# Patient Record
Sex: MALE | Race: WHITE | NOT HISPANIC OR LATINO | Employment: STUDENT | URBAN - METROPOLITAN AREA
[De-identification: names, ages, dates, MRNs, and addresses within clinical notes are randomized per-mention and may not be internally consistent; named-entity substitution may affect disease eponyms.]

---

## 2018-10-31 ENCOUNTER — OFFICE VISIT (OUTPATIENT)
Dept: URGENT CARE | Facility: CLINIC | Age: 28
End: 2018-10-31
Payer: MEDICAID

## 2018-10-31 VITALS
OXYGEN SATURATION: 97 % | WEIGHT: 147 LBS | DIASTOLIC BLOOD PRESSURE: 52 MMHG | BODY MASS INDEX: 23.07 KG/M2 | HEIGHT: 67 IN | TEMPERATURE: 98.8 F | SYSTOLIC BLOOD PRESSURE: 102 MMHG | HEART RATE: 82 BPM

## 2018-10-31 DIAGNOSIS — S86.812A PATELLAR TENDON STRAIN, LEFT, INITIAL ENCOUNTER: Primary | ICD-10-CM

## 2018-10-31 PROCEDURE — 99204 OFFICE O/P NEW MOD 45 MIN: CPT | Performed by: PHYSICIAN ASSISTANT

## 2018-10-31 RX ORDER — IBUPROFEN 600 MG/1
600 TABLET ORAL EVERY 8 HOURS PRN
Qty: 15 TAB | Refills: 0 | Status: SHIPPED | OUTPATIENT
Start: 2018-10-31 | End: 2018-11-05

## 2018-10-31 RX ORDER — ACYCLOVIR 400 MG/1
400 TABLET ORAL 2 TIMES DAILY
COMMUNITY
End: 2019-06-04

## 2018-10-31 ASSESSMENT — ENCOUNTER SYMPTOMS
BACK PAIN: 0
WEAKNESS: 0
CHILLS: 0
FALLS: 0
CHANGE IN BOWEL HABIT: 0
VOMITING: 0
NUMBNESS: 0
DIARRHEA: 0
CONSTIPATION: 0
JOINT SWELLING: 0
FEVER: 0
COUGH: 0
NECK PAIN: 0
MYALGIAS: 0
ABDOMINAL PAIN: 0
NAUSEA: 0

## 2018-10-31 NOTE — PATIENT INSTRUCTIONS
RICE for Routine Care of Injuries  Introduction  Many injuries can be cared for using rest, ice, compression, and elevation (RICE therapy). Using RICE therapy can help to lessen pain and swelling. It can help your body to heal.  Rest   Reduce your normal activities and avoid using the injured part of your body. You can go back to your normal activities when you feel okay and your doctor says it is okay.  Ice   Do not put ice on your bare skin.  · Put ice in a plastic bag.  · Place a towel between your skin and the bag.  · Leave the ice on for 20 minutes, 2-3 times a day.  Do this for as long as told by your doctor.  Compression   Compression means putting pressure on the injured area. This can be done with an elastic bandage. If an elastic bandage has been applied:  · Remove and reapply the bandage every 3-4 hours or as told by your doctor.  · Make sure the bandage is not wrapped too tight. Wrap the bandage more loosely if part of your body beyond the bandage is blue, swollen, cold, painful, or loses feeling (numb).  · See your doctor if the bandage seems to make your problems worse.  Elevation   Elevation means keeping the injured area raised. Raise the injured area above your heart or the center of your chest if you can.  When should I get help?  You should get help if:  · You keep having pain and swelling.  · Your symptoms get worse.  Get help right away if:  You should get help right away if:  · You have sudden bad pain at or below the area of your injury.  · You have redness or more swelling around your injury.  · You have tingling or numbness at or below the injury that does not go away when you take off the bandage.  This information is not intended to replace advice given to you by your health care provider. Make sure you discuss any questions you have with your health care provider.  Document Released: 06/05/2009 Document Revised: 05/25/2017 Document Reviewed: 11/25/2015  © 2017 [employer]  Knee Sprain,  Adult  A knee sprain is a stretch or tear in a knee ligament. Knee ligaments are bands of tissue that connect bones in the knee to each other.  What are the causes?  This condition often results from:  · A fall.  · An injury to the knee.  What are the signs or symptoms?  Symptoms of this condition include:  · Trouble bending the leg.  · Swelling in the knee.  · Bruising around the knee.  · Tenderness or pain in the knee.  · Muscle spasms around the knee.  How is this diagnosed?  This condition may be diagnosed based on:  · A physical exam.  · What happened just before you started to have symptoms.  · Tests, including:  ¨ An X-ray. This may be done to make sure no bones are broken.  ¨ An MRI. This may be done to check if the ligament is torn.  ¨ Stress testing of the knee. This may be done to check ligament damage.  How is this treated?  Treatment for this condition may involve:  · Keeping the knee still (immobilized) with a cast, brace, or splint.  · Applying ice to the knee. This helps with pain and swelling.  · Keeping the knee raised (elevated) above the level of your heart when you are resting. This helps with pain and swelling.  · Taking medicine for pain.  · Exercises to prevent or limit permanent weakness or stiffness in your knee.  · Surgery to reconnect the ligament to the bone or to reconstruct it. This may be needed if the ligament tore all the way.  Follow these instructions at home:  If you have a splint or brace:  · Wear the splint or brace as told by your health care provider. Remove it only as told by your health care provider.  · Loosen the splint or brace if your toes tingle, become numb, or turn cold and blue.  · Keep the splint or brace clean.  · If the splint or brace is not waterproof:  ¨ Do not let it get wet.  ¨ Cover it with a watertight covering when you take a bath or a shower.  If you have a cast:  · Do not stick anything inside the cast to scratch your skin. Doing that increases your  risk of infection.  · Check the skin around the cast every day. Tell your health care provider about any concerns.  · You may put lotion on dry skin around the edges of the cast. Do not put lotion on the skin underneath the cast.  · Keep the cast clean.  · If the cast is not waterproof:  ¨ Do not let it get wet.  ¨ Cover it with a watertight covering when you take a bath or a shower.  Managing pain, stiffness, and swelling  · If directed, put ice on the injured area.  ¨ If you have a removable splint or brace, remove it as told by your health care provider.  ¨ Put ice in a plastic bag.  ¨ Place a towel between your skin and the bag or between your cast and the bag.  ¨ Leave the ice on for 20 minutes, 2-3 times a day.  · Gently move your toes often to avoid stiffness and to lessen swelling.  · Elevate the injured area above the level of your heart while you are sitting or lying down.  · Take over-the-counter and prescription medicines only as told by your health care provider.  General instructions  · Do exercises as told by your health care provider.  · Keep all follow-up visits as told by your health care provider. This is important.  Contact a health care provider if:  · You have pain that gets worse.  · The cast, brace, or splint does not fit right.  · The cast, brace, or splint gets damaged.  Get help right away if:  · You cannot use your injured joint to support any of your body weight (cannot bear weight).  · You cannot move the injured joint.  · You cannot walk more than a few steps without pain or without your knee buckling.  · You have significant pain, swelling, or numbness below the cast, brace, or splint.  This information is not intended to replace advice given to you by your health care provider. Make sure you discuss any questions you have with your health care provider.  Document Released: 12/18/2006 Document Revised: 09/06/2017 Document Reviewed: 07/07/2017  Elsevier Interactive Patient Education ©  2017 [employer]

## 2018-10-31 NOTE — PROGRESS NOTES
Subjective:   Miguel Angel De is a 27 y.o. male who presents for Knee Pain (pain in left knee, lifting heavy box and knee bent in a weird way, [id number])        Knee Injury   This is a new problem. Episode onset: 11 days. The problem occurs intermittently. The problem has been waxing and waning. Pertinent negatives include no abdominal pain, change in bowel habit, chills, coughing, fever, joint swelling, myalgias, nausea, neck pain, numbness, vomiting or weakness. The symptoms are aggravated by walking and standing. He has tried NSAIDs for the symptoms. The treatment provided mild relief.       Pt was lifting a box approx 100 lbs of laptops and a pop in R knee. Pain is described as a 3-4/10. Pt able to weight bear. Prolonged standing makes pain worse. Bending and resting alleviates pain. No previous injury or surgeries.     Review of Systems   Constitutional: Negative for chills, fever and malaise/fatigue.   Respiratory: Negative for cough.    Gastrointestinal: Negative for abdominal pain, change in bowel habit, constipation, diarrhea, nausea and vomiting.   Musculoskeletal: Positive for joint pain. Negative for back pain, falls, joint swelling, myalgias and neck pain.   Neurological: Negative for weakness and numbness.   All other systems reviewed and are negative.      PMH:  has no past medical history on file.  MEDS:   Current Outpatient Prescriptions:   •  acyclovir (ZOVIRAX) 400 MG tablet, Take 400 mg by mouth 2 times a day., Disp: , Rfl:   •  ibuprofen (MOTRIN) 600 MG Tab, Take 1 Tab by mouth every 8 hours as needed for up to 5 days., Disp: 15 Tab, Rfl: 0  ALLERGIES: No Known Allergies  SURGHX: History reviewed. No pertinent surgical history.  SOCHX:  reports that he has never smoked. He has never used smokeless tobacco. He reports that he drinks alcohol.  History reviewed. No pertinent family history.     Objective:   /52 (BP Location: Left arm, Patient Position: Sitting, BP Cuff Size: Adult)   Pulse 82   " Temp 37.1 °C (98.8 °F) (Temporal)   Ht 1.702 m (5' 7\")   Wt 66.7 kg (147 lb)   SpO2 97%   BMI 23.02 kg/m²     Physical Exam   Constitutional: He is oriented to person, place, and time. He appears well-developed and well-nourished. No distress.   HENT:   Head: Normocephalic and atraumatic.   Eyes: Pupils are equal, round, and reactive to light. Conjunctivae are normal.   Cardiovascular: Normal rate and regular rhythm.    Pulmonary/Chest: Effort normal and breath sounds normal.   Musculoskeletal:        Left knee: He exhibits normal range of motion, no swelling, no effusion, no ecchymosis, no erythema, normal alignment, no LCL laxity, no bony tenderness, normal meniscus and no MCL laxity. Tenderness found. Patellar tendon tenderness noted. No medial joint line, no lateral joint line, no MCL and no LCL tenderness noted.        Legs:  Neurological: He is alert and oriented to person, place, and time.   Skin: Skin is warm and dry.   Psychiatric: He has a normal mood and affect. His behavior is normal.   Vitals reviewed.        Assessment/Plan:     1. Patellar tendon strain, left, initial encounter  ibuprofen (MOTRIN) 600 MG Tab       Ace wrap applied. Pt directed RICE and OTC Ibuprofen/Naproxen as needed for pain. If sx worsen or persist return to clinic we can consider referral to sports med. Educational handout on knee sprain provided.    Follow-up with primary care provider within 7-10 days. Red flags and emergency room precautions discussed.  Patient appears understanding of information.           "

## 2019-06-04 ENCOUNTER — OFFICE VISIT (OUTPATIENT)
Dept: URGENT CARE | Facility: CLINIC | Age: 29
End: 2019-06-04
Payer: MEDICAID

## 2019-06-04 VITALS
DIASTOLIC BLOOD PRESSURE: 74 MMHG | OXYGEN SATURATION: 98 % | HEART RATE: 69 BPM | WEIGHT: 150 LBS | RESPIRATION RATE: 16 BRPM | SYSTOLIC BLOOD PRESSURE: 112 MMHG | TEMPERATURE: 98.7 F | BODY MASS INDEX: 23.49 KG/M2

## 2019-06-04 DIAGNOSIS — B00.1 HERPES LABIALIS: ICD-10-CM

## 2019-06-04 PROCEDURE — 99214 OFFICE O/P EST MOD 30 MIN: CPT | Performed by: NURSE PRACTITIONER

## 2019-06-04 RX ORDER — ACYCLOVIR 400 MG/1
400 TABLET ORAL 3 TIMES DAILY
Qty: 30 TAB | Refills: 0 | Status: SHIPPED | OUTPATIENT
Start: 2019-06-04 | End: 2019-06-14

## 2019-08-23 ENCOUNTER — OFFICE VISIT (OUTPATIENT)
Dept: URGENT CARE | Facility: CLINIC | Age: 29
End: 2019-08-23
Payer: MEDICAID

## 2019-08-23 VITALS
DIASTOLIC BLOOD PRESSURE: 62 MMHG | BODY MASS INDEX: 22.82 KG/M2 | WEIGHT: 150.6 LBS | TEMPERATURE: 98.6 F | OXYGEN SATURATION: 99 % | RESPIRATION RATE: 16 BRPM | HEIGHT: 68 IN | HEART RATE: 74 BPM | SYSTOLIC BLOOD PRESSURE: 104 MMHG

## 2019-08-23 DIAGNOSIS — B00.1 COLD SORE: ICD-10-CM

## 2019-08-23 PROCEDURE — 99214 OFFICE O/P EST MOD 30 MIN: CPT | Performed by: NURSE PRACTITIONER

## 2019-08-23 RX ORDER — ACYCLOVIR 400 MG/1
400 TABLET ORAL 3 TIMES DAILY
Qty: 21 TAB | Refills: 0 | Status: SHIPPED | OUTPATIENT
Start: 2019-08-23 | End: 2019-08-30

## 2019-08-25 ASSESSMENT — ENCOUNTER SYMPTOMS
FEVER: 0
VOMITING: 0
CHILLS: 0
NAUSEA: 0
SHORTNESS OF BREATH: 0
EYE PAIN: 0
DIZZINESS: 0
SORE THROAT: 0
MYALGIAS: 0

## 2019-08-25 NOTE — PROGRESS NOTES
"Subjective:   Miguel Angel De is a 28 y.o. male who presents for Cold Sores (mid upper lip cold sore; needs rx refill for acyclivir)  Patient is a 28-year-old male presents clinic today for evaluation of a cold sore outbreak over the past couple days that has progressively worsened..  Patient states that he does have a history of cold sores in which she has taken acyclovir in the past that has been effective.  Is not taking anything for the symptoms.  Patient denies any sore throat, fever, chills.  He complains of mild discomfort of the upper lip.      HPI  Review of Systems   Constitutional: Negative for chills and fever.   HENT: Negative for sore throat.    Eyes: Negative for pain.   Respiratory: Negative for shortness of breath.    Cardiovascular: Negative for chest pain.   Gastrointestinal: Negative for nausea and vomiting.   Genitourinary: Negative for hematuria.   Musculoskeletal: Negative for myalgias.   Skin: Negative for rash.        Cold sores of upper lip   Neurological: Negative for dizziness.     No Known Allergies   Objective:   /62 (BP Location: Left arm, Patient Position: Sitting, BP Cuff Size: Adult)   Pulse 74   Temp 37 °C (98.6 °F) (Temporal)   Resp 16   Ht 1.727 m (5' 8\")   Wt 68.3 kg (150 lb 9.6 oz)   SpO2 99%   BMI 22.90 kg/m²   Physical Exam   Constitutional: He is oriented to person, place, and time. He appears well-developed and well-nourished. No distress.   HENT:   Head: Normocephalic and atraumatic.   Right Ear: Tympanic membrane normal.   Left Ear: Tympanic membrane normal.   Nose: Nose normal. Right sinus exhibits no maxillary sinus tenderness and no frontal sinus tenderness. Left sinus exhibits no maxillary sinus tenderness and no frontal sinus tenderness.   Mouth/Throat: Uvula is midline, oropharynx is clear and moist and mucous membranes are normal. No posterior oropharyngeal edema, posterior oropharyngeal erythema or tonsillar abscesses. No tonsillar exudate.       Eyes: " Pupils are equal, round, and reactive to light. Conjunctivae and EOM are normal. Right eye exhibits no discharge. Left eye exhibits no discharge.   Cardiovascular: Normal rate and regular rhythm.   No murmur heard.  Pulmonary/Chest: Effort normal and breath sounds normal. No respiratory distress.   Abdominal: Soft. He exhibits no distension. There is no tenderness.   Neurological: He is alert and oriented to person, place, and time. He has normal reflexes. No sensory deficit.   Skin: Skin is warm, dry and intact.   Psychiatric: He has a normal mood and affect.         Assessment/Plan:   1. Cold sore  - acyclovir (ZOVIRAX) 400 MG tablet; Take 1 Tab by mouth 3 times a day for 7 days.  Dispense: 21 Tab; Refill: 0    Patient given precautionary s/sx that mandate immediate follow up and evaluation in the ED. Advised of risks of not doing so.    DDX, Supportive care, and indications for immediate follow-up discussed with patient.    Instructed to return to clinic or nearest emergency department if we are not available for any change in condition, further concerns, or worsening of symptoms.    The patient demonstrated a good understanding and agreed with the treatment plan.

## 2025-05-21 ENCOUNTER — RESULTS FOLLOW-UP (OUTPATIENT)
Dept: URGENT CARE | Facility: PHYSICIAN GROUP | Age: 35
End: 2025-05-21

## 2025-05-21 ENCOUNTER — OFFICE VISIT (OUTPATIENT)
Dept: URGENT CARE | Facility: PHYSICIAN GROUP | Age: 35
End: 2025-05-21
Payer: COMMERCIAL

## 2025-05-21 VITALS
WEIGHT: 152 LBS | OXYGEN SATURATION: 98 % | BODY MASS INDEX: 23.04 KG/M2 | HEIGHT: 68 IN | RESPIRATION RATE: 18 BRPM | HEART RATE: 87 BPM | SYSTOLIC BLOOD PRESSURE: 124 MMHG | TEMPERATURE: 97.6 F | DIASTOLIC BLOOD PRESSURE: 62 MMHG

## 2025-05-21 DIAGNOSIS — J02.9 PHARYNGITIS, UNSPECIFIED ETIOLOGY: Primary | ICD-10-CM

## 2025-05-21 LAB — S PYO DNA SPEC NAA+PROBE: NOT DETECTED

## 2025-05-21 PROCEDURE — 99203 OFFICE O/P NEW LOW 30 MIN: CPT | Performed by: STUDENT IN AN ORGANIZED HEALTH CARE EDUCATION/TRAINING PROGRAM

## 2025-05-21 PROCEDURE — 87651 STREP A DNA AMP PROBE: CPT | Performed by: STUDENT IN AN ORGANIZED HEALTH CARE EDUCATION/TRAINING PROGRAM

## 2025-05-21 NOTE — PROGRESS NOTES
"Subjective:   Miguel Angel De is a 34 y.o. male who presents for Cough (Sore throat, slight cough, x 1 day. )      HPI:  34-year-old male presents to the urgent care for sore throat and cough that started within the past 24 hours.  Was recently around someone who tested positive for strep throat.  States he did not share any utensils, drinks, or food.  No other known sick exposures.  Has had a slight cough.  No nasal congestion, runny nose, or fever.    Medications:    SUDAFED PO    Allergies: Patient has no known allergies.    Problem List: Miguel Angel De does not have a problem list on file.    Surgical History:  Past Surgical History:   Procedure Laterality Date    DENTAL EXTRACTION(S)         Past Social Hx: Miguel Angel De  reports that he has never smoked. He has never used smokeless tobacco. He reports current alcohol use. He reports that he does not use drugs.     Past Family Hx:  Miguel Angel De family history is not on file.     Problem list, medications, and allergies reviewed by myself today in Epic.     Objective:     /62 (BP Location: Right arm, Patient Position: Sitting, BP Cuff Size: Adult)   Pulse 87   Temp 36.4 °C (97.6 °F) (Temporal)   Resp 18   Ht 1.727 m (5' 8\")   Wt 68.9 kg (152 lb)   SpO2 98%   BMI 23.11 kg/m²     Physical Exam  Vitals reviewed.   HENT:      Nose: Nose normal. No congestion or rhinorrhea.      Mouth/Throat:      Mouth: Mucous membranes are moist.      Pharynx: Posterior oropharyngeal erythema present.   Cardiovascular:      Rate and Rhythm: Normal rate and regular rhythm.      Pulses: Normal pulses.      Heart sounds: Normal heart sounds. No murmur heard.  Pulmonary:      Effort: Pulmonary effort is normal. No respiratory distress.      Breath sounds: Normal breath sounds. No stridor. No wheezing, rhonchi or rales.         Lab Results/POC Test Results   Results for orders placed or performed in visit on 05/21/25   POCT GROUP A STREP, PCR    Collection Time: 05/21/25  2:13 PM "   Result Value Ref Range    POC Group A Strep, PCR Not Detected Not Detected, Invalid           Assessment/Plan:     Diagnosis and associated orders:     1. Pharyngitis, unspecified etiology  POCT GROUP A STREP, PCR         Comments/MDM:     Negative group A strep.  I do believe this is likely an early presentation of an upper respiratory tract infection.  This should be self-limited.  Continue home supportive care.  Pulmonary exam shows no wheezing, rales, rhonchi.  Vitals are stable.  No signs of peritonsillar abscess on exam.         Differential diagnosis, natural history, supportive care, and indications for immediate follow-up discussed.    Advised the patient to follow-up with the primary care physician for recheck, reevaluation, and consideration of further management.    Please note that this dictation was created using voice recognition software. I have made a reasonable attempt to correct obvious errors, but I expect that there are errors of grammar and possibly content that I did not discover before finalizing the note.    Electronically signed by Renato Guerrier PA-C.

## 2025-06-06 ENCOUNTER — OFFICE VISIT (OUTPATIENT)
Dept: URGENT CARE | Facility: CLINIC | Age: 35
End: 2025-06-06
Payer: COMMERCIAL

## 2025-06-06 VITALS
RESPIRATION RATE: 15 BRPM | OXYGEN SATURATION: 100 % | HEART RATE: 82 BPM | HEIGHT: 68 IN | SYSTOLIC BLOOD PRESSURE: 110 MMHG | BODY MASS INDEX: 20.98 KG/M2 | TEMPERATURE: 97.9 F | WEIGHT: 138.4 LBS | DIASTOLIC BLOOD PRESSURE: 60 MMHG

## 2025-06-06 DIAGNOSIS — N50.812 LEFT TESTICULAR PAIN: Primary | ICD-10-CM

## 2025-06-06 PROCEDURE — 99213 OFFICE O/P EST LOW 20 MIN: CPT | Performed by: PHYSICIAN ASSISTANT

## 2025-06-06 PROCEDURE — 3074F SYST BP LT 130 MM HG: CPT | Performed by: PHYSICIAN ASSISTANT

## 2025-06-06 PROCEDURE — 3078F DIAST BP <80 MM HG: CPT | Performed by: PHYSICIAN ASSISTANT

## 2025-06-06 ASSESSMENT — ENCOUNTER SYMPTOMS
EYE REDNESS: 0
AFFECTED TESTICLE: 1
ABDOMINAL PAIN: 0
NAUSEA: 1
FEVER: 0
VOMITING: 0
EYE DISCHARGE: 0

## 2025-06-06 NOTE — PROGRESS NOTES
"Subjective     Miguel Angel De is a 34 y.o. male who presents with Testicle Pain (Thinks he pinched something in his groin area, happened yesterday )          This is a new problem.   The patient presents to clinic complaining of left testicular pain x 1 day.  The patient states he was not masturbating yesterday when he feels like he \"pinched\" something within his left testicle developing subsequent pain.  The patient reports associated pain to his left testicle with slight radiation of pain to the left groin.  The patient believes there may be mild swelling to the left testicle, stating he is having difficulty identifying the vas deferens.  The patient reports no associated bruising, redness, increased warmth, rashes/lesions.  He also reports no urinary symptoms.  No dysuria.  No hematuria.  The patient reports no associated fever.  No vomiting.  No abdominal pain.  The patient has not taken any OTC medications for his current symptoms.    Testicle Pain  Associated symptoms include nausea. Pertinent negatives include no abdominal pain, fever, rash, urinary symptoms or vomiting.     PMH:  has no past medical history on file.  MEDS: Current Medications[1]  ALLERGIES: Allergies[2]  SURGHX: Past Surgical History[3]  SOCHX:  reports that he has never smoked. He has never used smokeless tobacco. He reports current alcohol use. He reports that he does not use drugs.  FH: Family history was reviewed, no pertinent findings to report      Review of Systems   Constitutional:  Negative for fever.   Eyes:  Negative for discharge and redness.   Gastrointestinal:  Positive for nausea. Negative for abdominal pain and vomiting.   Genitourinary:  Negative for dysuria and hematuria.   Skin:  Negative for rash.              Objective     /60 (BP Location: Left arm, Patient Position: Sitting, BP Cuff Size: Adult)   Pulse 82   Temp 36.6 °C (97.9 °F) (Temporal)   Resp 15   Ht 1.727 m (5' 8\")   Wt 62.8 kg (138 lb 6.4 oz)   SpO2 " 100%   BMI 21.04 kg/m²      Physical Exam  Exam conducted with a chaperone present.   Constitutional:       General: He is not in acute distress.     Appearance: Normal appearance. He is well-developed. He is not ill-appearing.   HENT:      Head: Normocephalic and atraumatic.      Right Ear: External ear normal.      Left Ear: External ear normal.   Eyes:      Extraocular Movements: Extraocular movements intact.      Conjunctiva/sclera: Conjunctivae normal.   Cardiovascular:      Rate and Rhythm: Normal rate.   Pulmonary:      Effort: Pulmonary effort is normal.   Abdominal:      Hernia: There is no hernia in the left inguinal area.   Genitourinary:     Penis: Normal and circumcised.       Testes:         Left: Tenderness and swelling present. Mass, testicular hydrocele or varicocele not present.   Musculoskeletal:         General: Normal range of motion.      Cervical back: Normal range of motion and neck supple.   Skin:     General: Skin is warm and dry.   Neurological:      Mental Status: He is alert and oriented to person, place, and time.                  Progress:  Testicular US:  COMPARISON: None     FINDINGS:     The right testis measures 4.56 cm x 2.43 cm x 2.94 cm. Normal in size and echotexture. Normal vascularity on color Doppler. No intratesticular mass.     The left testis measures 4.64 cm x 2.23 cm x 3.16 cm. Normal in size and echotexture. Normal vascularity on color Doppler. No intratesticular mass. Minimal punctate calcifications noted.     Vascular flow is symmetric.     Cystic lesions in region of right epididymal head are noted with the largest measuring 2.1 x 1.6 x 1.8 cm.     No abnormal volume hydrocele is detected.     No varicocele is detected.     IMPRESSION:  1.  No testicular mass or torsion.     2.  Cystic lesions in the right epididymal head are identified. Differential diagnosis includes spermatocele and epididymal cyst.                  Assessment & Plan  Left testicular  "pain    Orders:    [id number]; Future             The patient's presenting symptoms and physical exam findings are consistent with left testicular pain.  The patient was masturbating yesterday when he feels like he \"pinched\" something within his left testicle developing subsequent pain.  On the patient she is exam, the patient's left testicle was tender and slightly swollen. The patient no palpable inguinal hernias. A chaperone was present throughout the duration of the patient's  exam.  The patient is nontoxic and appears in no acute distress.  The patient's vital signs are stable and within normal limits.  He is afebrile today in clinic.  A testicular ultrasound was obtained to further evaluate the patient's current symptoms.  The patient's testicular ultrasound showed cystic lesions in the right epididymal head, which could be caused by a spermatocele and or epididymal cyst.  There was no sign of testicular mass or torsion.  Advised the patient to monitor for worsening signs and or symptoms.  Recommend the patient follow-up with primary care and/or urology.  Discussed return precautions with the patient, and he verbalized understanding.    Differential diagnoses, supportive care, and indications for immediate follow-up discussed with patient.   Instructed to return to clinic or nearest emergency department for any change in condition, further concerns, or worsening of symptoms.    I personally reviewed prior external notes and test results pertinent to today's visit.  I have independently reviewed and interpreted all diagnostics ordered during this urgent care visit.     Please note that this dictation was created using voice recognition software. I have made every reasonable attempt to correct obvious errors, but I expect that there may be errors of grammar and possibly content that I did not discover before finalizing the note.     This note was electronically signed by Selma Arroyo PA-C       "     [1]   Current Outpatient Medications:     NON SPECIFIED, SUGAR-FREE CHOLESTYRAMINE RESIN 4000 MG POWDER FOR ORAL SUSPENSION [PREVALITE], Disp: , Rfl:   [2] No Known Allergies  [3]   Past Surgical History:  Procedure Laterality Date    DENTAL EXTRACTION(S)

## 2025-06-07 ENCOUNTER — HOSPITAL ENCOUNTER (OUTPATIENT)
Dept: RADIOLOGY | Facility: MEDICAL CENTER | Age: 35
End: 2025-06-07
Attending: PHYSICIAN ASSISTANT
Payer: COMMERCIAL

## 2025-06-07 DIAGNOSIS — N50.812 LEFT TESTICULAR PAIN: ICD-10-CM

## 2025-06-07 PROCEDURE — 76870 US EXAM SCROTUM: CPT

## 2025-06-08 ENCOUNTER — RESULTS FOLLOW-UP (OUTPATIENT)
Dept: URGENT CARE | Facility: CLINIC | Age: 35
End: 2025-06-08
Payer: COMMERCIAL

## 2025-06-17 ENCOUNTER — OFFICE VISIT (OUTPATIENT)
Dept: URGENT CARE | Facility: PHYSICIAN GROUP | Age: 35
End: 2025-06-17
Payer: COMMERCIAL

## 2025-06-17 VITALS
BODY MASS INDEX: 23.04 KG/M2 | SYSTOLIC BLOOD PRESSURE: 130 MMHG | DIASTOLIC BLOOD PRESSURE: 76 MMHG | OXYGEN SATURATION: 97 % | WEIGHT: 152 LBS | TEMPERATURE: 98.4 F | HEIGHT: 68 IN | HEART RATE: 78 BPM | RESPIRATION RATE: 14 BRPM

## 2025-06-17 DIAGNOSIS — K58.0 IRRITABLE BOWEL SYNDROME WITH DIARRHEA: Primary | ICD-10-CM

## 2025-06-17 PROBLEM — K21.9 GERD (GASTROESOPHAGEAL REFLUX DISEASE): Status: ACTIVE | Noted: 2025-06-17

## 2025-06-17 PROCEDURE — 3075F SYST BP GE 130 - 139MM HG: CPT

## 2025-06-17 PROCEDURE — 99213 OFFICE O/P EST LOW 20 MIN: CPT

## 2025-06-17 PROCEDURE — 3078F DIAST BP <80 MM HG: CPT

## 2025-06-17 RX ORDER — CHOLESTYRAMINE LIGHT 4 G/5.7G
POWDER, FOR SUSPENSION ORAL
Qty: 60 PACKET | Refills: 2 | Status: SHIPPED | OUTPATIENT
Start: 2025-06-17

## 2025-06-17 ASSESSMENT — ENCOUNTER SYMPTOMS
VOMITING: 0
DIZZINESS: 0
DIARRHEA: 0
SHORTNESS OF BREATH: 0
NAUSEA: 0
COUGH: 0
FEVER: 0
WEAKNESS: 0

## 2025-06-17 NOTE — ASSESSMENT & PLAN NOTE
Orders:    Referral to Gastroenterology    cholestyramine light (PREVALITE) 4 GM/DOSE powder; Use 1 packet by mouth every morning (4gm), and 1/2 a packet at night (2gm), can increase evening dose to 4gm as neeeded.

## 2025-06-17 NOTE — PROGRESS NOTES
"Subjective     Miguel Angel De is a 34 y.o. male who presents with Medication Refill (IBS refill.)            Miguel Angel is here today requesting a refill of his IBS medication Prevalite. He notes he only has a few days worth of medication at home He usually see Dr. Marquez at [facility] and last saw him about a year ago but due to a change in insurance he needs a new GI referral to be seen again by GI. He states he has been taking this for over 2 years and is tolerating it well and it has greatly helped his diarrhea and cramping symptoms. He currently does not have a PCP although has an appointment to establish with a PCP in 6 weeks. He denies any abdominal pain, fever, or blood in his stool. He does not take any other medications or supplements regularly.        Review of Systems   Constitutional:  Negative for fever and malaise/fatigue.   HENT:  Negative for congestion.    Respiratory:  Negative for cough and shortness of breath.    Cardiovascular:  Negative for chest pain.   Gastrointestinal:  Negative for diarrhea, nausea and vomiting.   Skin:  Negative for rash.   Neurological:  Negative for dizziness and weakness.   All other systems reviewed and are negative.             Objective     /76 (BP Location: Right arm, Patient Position: Sitting, BP Cuff Size: Adult)   Pulse 78   Temp 36.9 °C (98.4 °F) (Temporal)   Resp 14   Ht 1.727 m (5' 8\")   Wt 68.9 kg (152 lb)   SpO2 97%   BMI 23.11 kg/m²      Physical Exam  Vitals reviewed.   Constitutional:       Appearance: Normal appearance.   HENT:      Head: Normocephalic and atraumatic.      Nose: Nose normal.   Eyes:      Conjunctiva/sclera: Conjunctivae normal.   Cardiovascular:      Rate and Rhythm: Normal rate.   Pulmonary:      Effort: Pulmonary effort is normal.   Abdominal:      General: Abdomen is flat.   Musculoskeletal:         General: Normal range of motion.   Skin:     General: Skin is warm and dry.   Neurological:      Mental " Status: He is alert and oriented to person, place, and time.   Psychiatric:         Behavior: Behavior normal.         Judgment: Judgment normal.                                  Assessment & Plan  Irritable bowel syndrome with diarrhea    Orders:    Referral to Gastroenterology    cholestyramine light (PREVALITE) 4 GM/DOSE powder; Use 1 packet by mouth every morning (4gm), and 1/2 a packet at night (2gm), can increase evening dose to 4gm as neeeded.     Given his long term usage, refill sent to pharmacy to prevent lapse in treatment. Referral placed for GI Dr. Maruqez at [facility], encouraged him to make follow up appointment as soon as possible. Additionally encouraged him to keep PCP appointment in 6 weeks.    Shared decision-making was utilized with Miguel Angel for plan of care. Discussed differential diagnoses, natural history, and supportive care. Reviewed indication for use and the potential benefits and side effects of medications. Miguel Angel was encouraged to monitor symptoms closely and educated about signs and symptoms that would warrant concern and mandate seeking a higher level of service through the emergency department discussed at length. All questions answered to Miguel Angel's satisfaction and they were in agreement with treatment plan at time of discharge.

## 2025-06-25 ENCOUNTER — TELEMEDICINE (OUTPATIENT)
Dept: TELEHEALTH | Facility: TELEMEDICINE | Age: 35
End: 2025-06-25
Payer: COMMERCIAL

## 2025-06-25 DIAGNOSIS — B00.1 COLD SORE: Primary | ICD-10-CM

## 2025-06-25 RX ORDER — VALACYCLOVIR HYDROCHLORIDE 1 G/1
1000 TABLET, FILM COATED ORAL 2 TIMES DAILY
Qty: 14 TABLET | Refills: 0 | Status: SHIPPED | OUTPATIENT
Start: 2025-06-25 | End: 2025-07-02

## 2025-06-25 NOTE — PROGRESS NOTES
Virtual Visit: Established Patient   This visit was conducted via Teams using secure and encrypted videoconferencing technology.   The patient was in their home in the state of [place].    The patient's identity was confirmed and verbal consent was obtained for this virtual visit.    Subjective:   CC: No chief complaint on file.    Miguel Angel De is a 34 y.o. male presenting for evaluation and management of:    Patient with previous medical history of cold sores presents to urgent care with concerns of burning, tingling sensation of the left upper lip.  Symptoms started yesterday.  He he states the rash has not yet appeared.  He states he typically responds well to acyclovir or valacyclovir when he develops the prodromal symptoms.  Denies fever, chills, body aches.  Denies rash in other parts of his body.  Denies concerns for STDs.    ROS   As above    Current medicines (including changes today)  Current Medications[1]    Patient Active Problem List    Diagnosis Date Noted    Irritable bowel syndrome with diarrhea 06/17/2025    GERD (gastroesophageal reflux disease) 06/17/2025        Objective:   There were no vitals taken for this visit.    Physical Exam:  Constitutional: Alert, no distress, well-groomed.  Skin: No rashes in visible areas.  Eye: Round. Conjunctiva clear, lids normal. No icterus.   ENMT: Lips pink without lesions, good dentition, moist mucous membranes. Phonation normal.  Neck: No masses, no thyromegaly. Moves freely without pain.  Respiratory: Unlabored respiratory effort, no cough or audible wheeze  Psych: Alert and oriented x3, normal affect and mood.     Assessment and Plan:   The following treatment plan was discussed:     1. Cold sore  - valacyclovir (VALTREX) 1 g Tab; Take 1 Tablet by mouth 2 times a day for 7 days.  Dispense: 14 Tablet; Refill: 0      Follow-up: No follow-ups on file.              [1]   Current Outpatient Medications   Medication Sig Dispense Refill    valacyclovir (VALTREX) 1  g Tab Take 1 Tablet by mouth 2 times a day for 7 days. 14 Tablet 0    cholestyramine light (PREVALITE) 4 GM/DOSE powder Use 1 packet by mouth every morning (4gm), and 1/2 a packet at night (2gm), can increase evening dose to 4gm as neeeded. 60 Packet 2    NON SPECIFIED SUGAR-FREE CHOLESTYRAMINE RESIN 4000 MG POWDER FOR ORAL SUSPENSION [PREVALITE]       No current facility-administered medications for this visit.

## 2025-07-22 SDOH — ECONOMIC STABILITY: INCOME INSECURITY: IN THE LAST 12 MONTHS, WAS THERE A TIME WHEN YOU WERE NOT ABLE TO PAY THE MORTGAGE OR RENT ON TIME?: NO

## 2025-07-22 SDOH — HEALTH STABILITY: PHYSICAL HEALTH: ON AVERAGE, HOW MANY DAYS PER WEEK DO YOU ENGAGE IN MODERATE TO STRENUOUS EXERCISE (LIKE A BRISK WALK)?: 7 DAYS

## 2025-07-22 SDOH — HEALTH STABILITY: PHYSICAL HEALTH: ON AVERAGE, HOW MANY MINUTES DO YOU ENGAGE IN EXERCISE AT THIS LEVEL?: 20 MIN

## 2025-07-22 SDOH — ECONOMIC STABILITY: TRANSPORTATION INSECURITY
IN THE PAST 12 MONTHS, HAS LACK OF RELIABLE TRANSPORTATION KEPT YOU FROM MEDICAL APPOINTMENTS, MEETINGS, WORK OR FROM GETTING THINGS NEEDED FOR DAILY LIVING?: NO

## 2025-07-22 SDOH — ECONOMIC STABILITY: INCOME INSECURITY: HOW HARD IS IT FOR YOU TO PAY FOR THE VERY BASICS LIKE FOOD, HOUSING, MEDICAL CARE, AND HEATING?: NOT HARD AT ALL

## 2025-07-22 SDOH — ECONOMIC STABILITY: FOOD INSECURITY: WITHIN THE PAST 12 MONTHS, THE FOOD YOU BOUGHT JUST DIDN'T LAST AND YOU DIDN'T HAVE MONEY TO GET MORE.: NEVER TRUE

## 2025-07-22 SDOH — ECONOMIC STABILITY: TRANSPORTATION INSECURITY
IN THE PAST 12 MONTHS, HAS LACK OF TRANSPORTATION KEPT YOU FROM MEETINGS, WORK, OR FROM GETTING THINGS NEEDED FOR DAILY LIVING?: NO

## 2025-07-22 SDOH — ECONOMIC STABILITY: FOOD INSECURITY: WITHIN THE PAST 12 MONTHS, YOU WORRIED THAT YOUR FOOD WOULD RUN OUT BEFORE YOU GOT MONEY TO BUY MORE.: NEVER TRUE

## 2025-07-22 SDOH — HEALTH STABILITY: MENTAL HEALTH
STRESS IS WHEN SOMEONE FEELS TENSE, NERVOUS, ANXIOUS, OR CAN'T SLEEP AT NIGHT BECAUSE THEIR MIND IS TROUBLED. HOW STRESSED ARE YOU?: TO SOME EXTENT

## 2025-07-22 SDOH — ECONOMIC STABILITY: TRANSPORTATION INSECURITY
IN THE PAST 12 MONTHS, HAS THE LACK OF TRANSPORTATION KEPT YOU FROM MEDICAL APPOINTMENTS OR FROM GETTING MEDICATIONS?: NO

## 2025-07-22 SDOH — ECONOMIC STABILITY: HOUSING INSECURITY
IN THE LAST 12 MONTHS, WAS THERE A TIME WHEN YOU DID NOT HAVE A STEADY PLACE TO SLEEP OR SLEPT IN A SHELTER (INCLUDING NOW)?: NO

## 2025-07-22 ASSESSMENT — LIFESTYLE VARIABLES
HOW OFTEN DO YOU HAVE SIX OR MORE DRINKS ON ONE OCCASION: NEVER
SKIP TO QUESTIONS 9-10: 1
AUDIT-C TOTAL SCORE: 3
HOW OFTEN DO YOU HAVE A DRINK CONTAINING ALCOHOL: 2-3 TIMES A WEEK
HOW MANY STANDARD DRINKS CONTAINING ALCOHOL DO YOU HAVE ON A TYPICAL DAY: 1 OR 2

## 2025-07-22 ASSESSMENT — SOCIAL DETERMINANTS OF HEALTH (SDOH)
HOW MANY DRINKS CONTAINING ALCOHOL DO YOU HAVE ON A TYPICAL DAY WHEN YOU ARE DRINKING: 1 OR 2
IN THE PAST 12 MONTHS, HAS THE ELECTRIC, GAS, OIL, OR WATER COMPANY THREATENED TO SHUT OFF SERVICE IN YOUR HOME?: NO
ARE YOU MARRIED, WIDOWED, DIVORCED, SEPARATED, NEVER MARRIED, OR LIVING WITH A PARTNER?: NEVER MARRIED
ARE YOU MARRIED, WIDOWED, DIVORCED, SEPARATED, NEVER MARRIED, OR LIVING WITH A PARTNER?: NEVER MARRIED
HOW OFTEN DO YOU ATTEND CHURCH OR RELIGIOUS SERVICES?: NEVER
HOW OFTEN DO YOU ATTENT MEETINGS OF THE CLUB OR ORGANIZATION YOU BELONG TO?: NEVER
DO YOU BELONG TO ANY CLUBS OR ORGANIZATIONS SUCH AS CHURCH GROUPS UNIONS, FRATERNAL OR ATHLETIC GROUPS, OR SCHOOL GROUPS?: NO
HOW OFTEN DO YOU HAVE A DRINK CONTAINING ALCOHOL: 2-3 TIMES A WEEK
IN A TYPICAL WEEK, HOW MANY TIMES DO YOU TALK ON THE PHONE WITH FAMILY, FRIENDS, OR NEIGHBORS?: TWICE A WEEK
WITHIN THE PAST 12 MONTHS, YOU WORRIED THAT YOUR FOOD WOULD RUN OUT BEFORE YOU GOT THE MONEY TO BUY MORE: NEVER TRUE
IN A TYPICAL WEEK, HOW MANY TIMES DO YOU TALK ON THE PHONE WITH FAMILY, FRIENDS, OR NEIGHBORS?: TWICE A WEEK
HOW OFTEN DO YOU HAVE SIX OR MORE DRINKS ON ONE OCCASION: NEVER
HOW OFTEN DO YOU GET TOGETHER WITH FRIENDS OR RELATIVES?: TWICE A WEEK
HOW OFTEN DO YOU GET TOGETHER WITH FRIENDS OR RELATIVES?: TWICE A WEEK
HOW OFTEN DO YOU ATTENT MEETINGS OF THE CLUB OR ORGANIZATION YOU BELONG TO?: NEVER
HOW OFTEN DO YOU ATTEND CHURCH OR RELIGIOUS SERVICES?: NEVER
HOW HARD IS IT FOR YOU TO PAY FOR THE VERY BASICS LIKE FOOD, HOUSING, MEDICAL CARE, AND HEATING?: NOT HARD AT ALL
DO YOU BELONG TO ANY CLUBS OR ORGANIZATIONS SUCH AS CHURCH GROUPS UNIONS, FRATERNAL OR ATHLETIC GROUPS, OR SCHOOL GROUPS?: NO

## 2025-07-23 ENCOUNTER — OFFICE VISIT (OUTPATIENT)
Dept: MEDICAL GROUP | Facility: MEDICAL CENTER | Age: 35
End: 2025-07-23
Payer: COMMERCIAL

## 2025-07-23 VITALS
SYSTOLIC BLOOD PRESSURE: 120 MMHG | OXYGEN SATURATION: 98 % | BODY MASS INDEX: 22.5 KG/M2 | TEMPERATURE: 98.7 F | WEIGHT: 151.9 LBS | HEART RATE: 83 BPM | HEIGHT: 69 IN | DIASTOLIC BLOOD PRESSURE: 50 MMHG

## 2025-07-23 DIAGNOSIS — Z23 NEED FOR VACCINATION: ICD-10-CM

## 2025-07-23 DIAGNOSIS — M79.644 CHRONIC PAIN OF RIGHT THUMB: Primary | ICD-10-CM

## 2025-07-23 DIAGNOSIS — H92.01 RIGHT EAR PAIN: ICD-10-CM

## 2025-07-23 DIAGNOSIS — G89.29 CHRONIC PAIN OF RIGHT THUMB: Primary | ICD-10-CM

## 2025-07-23 PROCEDURE — 90471 IMMUNIZATION ADMIN: CPT | Performed by: HEALTH CARE PROVIDER

## 2025-07-23 PROCEDURE — 3074F SYST BP LT 130 MM HG: CPT | Performed by: HEALTH CARE PROVIDER

## 2025-07-23 PROCEDURE — 99213 OFFICE O/P EST LOW 20 MIN: CPT | Mod: 25 | Performed by: HEALTH CARE PROVIDER

## 2025-07-23 PROCEDURE — 3078F DIAST BP <80 MM HG: CPT | Performed by: HEALTH CARE PROVIDER

## 2025-07-23 PROCEDURE — 90715 TDAP VACCINE 7 YRS/> IM: CPT | Performed by: HEALTH CARE PROVIDER

## 2025-07-23 ASSESSMENT — ENCOUNTER SYMPTOMS
CHILLS: 0
SORE THROAT: 0
TINGLING: 0
DIZZINESS: 0
PALPITATIONS: 0
SHORTNESS OF BREATH: 0
SINUS PAIN: 0
FEVER: 0
SENSORY CHANGE: 0

## 2025-07-23 ASSESSMENT — PATIENT HEALTH QUESTIONNAIRE - PHQ9: CLINICAL INTERPRETATION OF PHQ2 SCORE: 0

## 2025-07-23 NOTE — ASSESSMENT & PLAN NOTE
Chronic, uncomplicated.  - According to patient's report of urgent care x-rays there was no fracture, possible report of arthritis  - History and physical examination most consistent with soft tissue inflammation secondary to overuse, patient spends a significant amount of time on computer or playing video games.  - Would benefit from skilled physical/hand therapy, only consider MRI if this persists for 6 to 8 weeks despite therapy  - Encouraged conservative measures to include rest and ice as needed  Orders:    Referral to Hand Therapy

## 2025-07-23 NOTE — PROGRESS NOTES
"Subjective:     CC:    Chief Complaint   Patient presents with    Establish Care    Other     Issues with thump  Urgent care said it may be aurthritusd     Ear Fullness     Few years  Feels like there is fluid in his ear   Now is painful  Urgent care prescribed drops but they are not helping        HISTORY OF THE PRESENT ILLNESS: Patient is a 34 y.o. male. This pleasant patient is here today to establish care and discuss right ear pain, states feels like he has had fluid in his ear for years but only started hurting last few months.  Denies any fevers or chills, no allergy symptoms to include sneezing, nasal congestion.    Problem   Chronic Pain of Right Thumb    Since 02/2025 - did have XR at [facility] - they stated arthritis. Did have injury in 2016 with a strain trying to use a caulking gun - no issues since then. He thinks he might have been overusing it. Typing and playing video games has hurt. No numbness, loss of function.     States pain was relieved using Voltaren.          ROS:   Review of Systems   Constitutional:  Negative for chills, fever and malaise/fatigue.   HENT:  Positive for ear pain. Negative for congestion, ear discharge, hearing loss, sinus pain, sore throat and tinnitus.    Respiratory:  Negative for shortness of breath.    Cardiovascular:  Negative for chest pain and palpitations.   Musculoskeletal:  Positive for joint pain.   Neurological:  Negative for dizziness, tingling and sensory change.         Objective:     Exam: /50   Pulse 83   Temp 37.1 °C (98.7 °F) (Temporal)   Ht 1.742 m (5' 8.58\")   Wt 68.9 kg (151 lb 14.4 oz)   SpO2 98%  Body mass index is 22.7 kg/m².    Physical Exam  Constitutional:       General: He is not in acute distress.     Appearance: Normal appearance.   HENT:      Head: Normocephalic.   Eyes:      Conjunctiva/sclera: Conjunctivae normal.   Cardiovascular:      Rate and Rhythm: Normal rate and regular rhythm.      Heart sounds: No murmur heard.  Pulmonary: "      Effort: Pulmonary effort is normal. No respiratory distress.   Lymphadenopathy:      Cervical: No cervical adenopathy.   Neurological:      General: No focal deficit present.      Mental Status: He is alert.   Psychiatric:         Mood and Affect: Mood normal.         Behavior: Behavior normal.           Assessment & Plan:   34 y.o. male with the following -    Assessment & Plan  Chronic pain of right thumb  Chronic, uncomplicated.  - According to patient's report of urgent care x-rays there was no fracture, possible report of arthritis  - History and physical examination most consistent with soft tissue inflammation secondary to overuse, patient spends a significant amount of time on computer or playing video games.  - Would benefit from skilled physical/hand therapy, only consider MRI if this persists for 6 to 8 weeks despite therapy  - Encouraged conservative measures to include rest and ice as needed  Orders:    Referral to Hand Therapy    Right ear pain  Uncomplicated right eustachian tube dysfunction.  - Encouraged patient to use fluticasone 2 sprays each nostril every night for 14 days.  - Return precautions of fever or significant change in symptoms  - If this becomes a persistent and recurrent course consider referral to ENT.       Need for vaccination    Orders:    Tdap Vaccine =>6YO IM        No follow-ups on file.    Please note that this dictation was created using voice recognition software. I have made every reasonable attempt to correct obvious errors, but I expect that there are errors of grammar and possibly content that I did not discover before finalizing the note.

## 2025-08-11 ENCOUNTER — OCCUPATIONAL THERAPY (OUTPATIENT)
Dept: OCCUPATIONAL THERAPY | Facility: REHABILITATION | Age: 35
End: 2025-08-11
Attending: HEALTH CARE PROVIDER
Payer: COMMERCIAL

## 2025-08-11 DIAGNOSIS — G89.29 CHRONIC PAIN OF RIGHT THUMB: Primary | ICD-10-CM

## 2025-08-11 DIAGNOSIS — M79.644 CHRONIC PAIN OF RIGHT THUMB: Primary | ICD-10-CM

## 2025-08-11 PROCEDURE — 97165 OT EVAL LOW COMPLEX 30 MIN: CPT

## 2025-08-11 PROCEDURE — 97110 THERAPEUTIC EXERCISES: CPT

## 2025-08-11 ASSESSMENT — ENCOUNTER SYMPTOMS
QUALITY: ACHING
PAIN SCALE: 4
PAIN TIMING: INTERMITTENT
PAIN SCALE AT HIGHEST: 5
ALLEVIATING FACTORS: PAIN MEDICATION
PAIN LOCATION: RIGHT THUMB
ALLEVIATING FACTORS: ICE
EXACERBATED BY: GRIPPING
PAIN SCALE AT LOWEST: 3
QUALITY: BURNING
EXACERBATED BY: KEYBOARDING
ALLEVIATING FACTORS: REST